# Patient Record
Sex: MALE
[De-identification: names, ages, dates, MRNs, and addresses within clinical notes are randomized per-mention and may not be internally consistent; named-entity substitution may affect disease eponyms.]

---

## 2021-09-09 PROBLEM — Z00.00 ENCOUNTER FOR PREVENTIVE HEALTH EXAMINATION: Status: ACTIVE | Noted: 2021-09-09

## 2021-10-03 NOTE — PHYSICAL EXAM
[de-identified] : Xray Lumbar Spine from [facility] on 8/12/2021 - Multiple views were reviewed with the patient in detail.\par \par There are 5 lumbar vertebral segments.  No scoliosis is observed.  No compression fracture or spondylolisthesis is identified.  Mild multilevel spondylosis is observed.  Mild to moderate multilevel facet arthropathy is identified with particular involvement of L5-S1.  Mild hypertrophic arthropathy involving the inferior aspect of the sacroiliac joints.  Mild aortic arteriosclerosis is observed.

## 2021-10-04 ENCOUNTER — APPOINTMENT (OUTPATIENT)
Dept: ORTHOPEDIC SURGERY | Facility: CLINIC | Age: 56
End: 2021-10-04

## 2025-09-04 ENCOUNTER — APPOINTMENT (OUTPATIENT)
Dept: ORTHOPEDIC SURGERY | Age: 60
End: 2025-09-04
Payer: COMMERCIAL

## 2025-09-04 DIAGNOSIS — S69.91XD UNSPECIFIED INJURY OF RIGHT WRIST, HAND AND FINGER(S), SUBSEQUENT ENCOUNTER: ICD-10-CM

## 2025-09-04 PROCEDURE — 73140 X-RAY EXAM OF FINGER(S): CPT | Mod: RT

## 2025-09-04 PROCEDURE — 26740 TREAT FINGER FRACTURE EACH: CPT | Mod: F6

## 2025-09-04 PROCEDURE — 99204 OFFICE O/P NEW MOD 45 MIN: CPT | Mod: 25
